# Patient Record
Sex: FEMALE | Race: WHITE | NOT HISPANIC OR LATINO | URBAN - METROPOLITAN AREA
[De-identification: names, ages, dates, MRNs, and addresses within clinical notes are randomized per-mention and may not be internally consistent; named-entity substitution may affect disease eponyms.]

---

## 2020-02-06 ENCOUNTER — TRANSFERRED RECORDS (OUTPATIENT)
Dept: HEALTH INFORMATION MANAGEMENT | Facility: CLINIC | Age: 35
End: 2020-02-06

## 2020-03-26 ENCOUNTER — TRANSFERRED RECORDS (OUTPATIENT)
Dept: HEALTH INFORMATION MANAGEMENT | Facility: CLINIC | Age: 35
End: 2020-03-26

## 2020-08-25 PROBLEM — H43.89 VITRITIS: Status: ACTIVE | Noted: 2020-08-25

## 2020-08-26 ENCOUNTER — OFFICE VISIT (OUTPATIENT)
Dept: OPHTHALMOLOGY | Facility: CLINIC | Age: 35
End: 2020-08-26
Attending: OPHTHALMOLOGY
Payer: MEDICARE

## 2020-08-26 DIAGNOSIS — H35.063 RETINAL VASCULITIS OF BOTH EYES: Primary | ICD-10-CM

## 2020-08-26 DIAGNOSIS — Z79.899 HIGH RISK MEDICATION USE: ICD-10-CM

## 2020-08-26 DIAGNOSIS — Z15.89 HLA B27 (HLA B27 POSITIVE): ICD-10-CM

## 2020-08-26 DIAGNOSIS — G93.2 PSEUDOTUMOR CEREBRI: ICD-10-CM

## 2020-08-26 PROBLEM — H30.23 INTERMEDIATE UVEITIS OF BOTH EYES: Status: ACTIVE | Noted: 2020-08-18

## 2020-08-26 PROBLEM — H20.9 UVEITIS: Status: ACTIVE | Noted: 2020-08-18

## 2020-08-26 PROCEDURE — 92134 CPTRZ OPH DX IMG PST SGM RTA: CPT | Mod: ZF | Performed by: OPHTHALMOLOGY

## 2020-08-26 PROCEDURE — G0463 HOSPITAL OUTPT CLINIC VISIT: HCPCS | Mod: 25

## 2020-08-26 PROCEDURE — 92235 FLUORESCEIN ANGRPH MLTIFRAME: CPT | Mod: ZF | Performed by: OPHTHALMOLOGY

## 2020-08-26 RX ORDER — HYDROXYZINE HYDROCHLORIDE 25 MG/1
25-50 TABLET, FILM COATED ORAL
COMMUNITY
Start: 2020-08-18 | End: 2022-05-20

## 2020-08-26 RX ORDER — LORAZEPAM 0.5 MG/1
0.5 TABLET ORAL
COMMUNITY
Start: 2019-03-25 | End: 2020-12-15

## 2020-08-26 RX ORDER — LEVOCETIRIZINE DIHYDROCHLORIDE 5 MG/1
5 TABLET, FILM COATED ORAL
COMMUNITY
Start: 2020-08-18

## 2020-08-26 ASSESSMENT — CONF VISUAL FIELD
OD_NORMAL: 1
METHOD: COUNTING FINGERS
OS_NORMAL: 1

## 2020-08-26 ASSESSMENT — SLIT LAMP EXAM - LIDS
COMMENTS: NORMAL
COMMENTS: NORMAL

## 2020-08-26 ASSESSMENT — VISUAL ACUITY
OD_SC: 20/20
OS_SC+: -2
OS_SC: 20/25
METHOD: SNELLEN - LINEAR

## 2020-08-26 ASSESSMENT — TONOMETRY
OS_IOP_MMHG: 19
OD_IOP_MMHG: 18
IOP_METHOD: TONOPEN

## 2020-08-26 ASSESSMENT — EXTERNAL EXAM - RIGHT EYE: OD_EXAM: NORMAL

## 2020-08-26 ASSESSMENT — CUP TO DISC RATIO
OD_RATIO: 0.3
OS_RATIO: 0.3

## 2020-08-26 ASSESSMENT — EXTERNAL EXAM - LEFT EYE: OS_EXAM: NORMAL

## 2020-08-26 NOTE — PATIENT INSTRUCTIONS
Additional diagnostic testing for causes of retinal vasculitis: ANCA, Lyme, Bartonella, Quantiferon, Syphilis    Will follow up with Rheumatology as needed    Recommend allergy eye drops or over the counter artificial tears to see if this will help. Would not recommend

## 2020-08-26 NOTE — NURSING NOTE
Chief Complaints and History of Present Illnesses   Patient presents with     Uveitis Evaluation     Chief Complaint(s) and History of Present Illness(es)     Uveitis Evaluation     Laterality: left eye    Quality: In bright lighting she will see a gray sludge over the va.  She noticed it in Nov.      Associated symptoms: photophobia (not new), flashes (in the past has seen sparkles and lights) and floaters (have increased with the gray sludge)    Treatments tried: eye drops    Pain scale: 0/10              Comments     Hx of pseudotumor and pappliadima  in 2012  Using a medicated gtt for the redness every day  Ju Monroy COT 8:24 AM August 26, 2020

## 2020-08-27 ENCOUNTER — TRANSFERRED RECORDS (OUTPATIENT)
Dept: HEALTH INFORMATION MANAGEMENT | Facility: CLINIC | Age: 35
End: 2020-08-27

## 2020-09-18 ENCOUNTER — TELEPHONE (OUTPATIENT)
Dept: RHEUMATOLOGY | Facility: CLINIC | Age: 35
End: 2020-09-18

## 2020-09-18 NOTE — TELEPHONE ENCOUNTER
[facility] Call Center    Phone Message    May a detailed message be left on voicemail: yes     Reason for Call: Appointment Intake    Referring Provider Name: Joel  Diagnosis and/or Symptoms: HLA B27 (HLA B27 positive) [Z15.89]  High risk medication use [Z79.899]      Action Taken: Message routed to:  [facility] Rheum    Travel Screening: Not Applicable

## 2020-09-28 NOTE — TELEPHONE ENCOUNTER
Patient is referred by Bernardo Maldonado MD  for positive HLA-B27    We may ultimately decide that steroid sparing therapy may be warranted but would appreciate rheumatology involvement which may help dictate specific therapy.  Would be in favor of an antimetabolite such as oral mycophenolate    Has patient seen a Rheumatologist in the past? Patient has seen 2 Rheumatologists in the past. Dr. Dudley Benjamin who per referring diagnosed patient with ankylosing spondylitis. Patient also saw Ira Camacho at [facility], last seen 5/4/2020 and did not think that patient has ankylosing spondylitis.     Patient complains of: back pain    Per referring upon exam:      Discussed findings with patient. The HLA B27 positivity may be related ankylosing spondylitis but there is no associated anterior uveitis (typical uveitis association)    The redness of the right eye is likely associated with some allergic changes, although this was not affected by the steroid eye drops. Recommend allergy eye drops or over the counter artificial tears to see if this will help with the redness    The retinal vascular angiography today shows large vessel inflammation which is likely the cause of the floaters in the vision. There are no occlusions ,macular edema, or retinal neovascularization to warrant any urgent treatment at this time.  Discussed that topical steroids are unlikely to significantly improve the symptoms, and that oral prednisone could be used for flares but this is not ideal to be used for long periods of time.  Recommend additional work-up as below    Has patient had imaging that pertains to referral? No    Has patient had labs that pertains to referral reason: Yes    Records are needed from Dr. Benjamin's office prior to making a scheduling decision.     ANCA lab was ordered but never completed.     Sending to clinic support to assist with obtaining records.     Jasmin Robles CMA   9/28/2020 1:26 PM

## 2020-09-30 ENCOUNTER — OFFICE VISIT (OUTPATIENT)
Dept: OPHTHALMOLOGY | Facility: CLINIC | Age: 35
End: 2020-09-30
Attending: OPHTHALMOLOGY
Payer: MEDICARE

## 2020-09-30 DIAGNOSIS — Z15.89 HLA B27 (HLA B27 POSITIVE): ICD-10-CM

## 2020-09-30 DIAGNOSIS — Z79.899 HIGH RISK MEDICATION USE: ICD-10-CM

## 2020-09-30 DIAGNOSIS — G93.2 PSEUDOTUMOR CEREBRI: ICD-10-CM

## 2020-09-30 DIAGNOSIS — H35.063 RETINAL VASCULITIS OF BOTH EYES: Primary | ICD-10-CM

## 2020-09-30 PROCEDURE — G0463 HOSPITAL OUTPT CLINIC VISIT: HCPCS | Mod: ZF

## 2020-09-30 RX ORDER — PREDNISONE 5 MG/1
TABLET ORAL
Qty: 80 TABLET | Refills: 0 | Status: SHIPPED | OUTPATIENT
Start: 2020-09-30 | End: 2020-12-15

## 2020-09-30 ASSESSMENT — EXTERNAL EXAM - RIGHT EYE: OD_EXAM: NORMAL

## 2020-09-30 ASSESSMENT — TONOMETRY
OS_IOP_MMHG: 17
OD_IOP_MMHG: 17
IOP_METHOD: TONOPEN

## 2020-09-30 ASSESSMENT — VISUAL ACUITY
OS_SC+: -2
METHOD: SNELLEN - LINEAR
OS_SC: 20/25 SLOW
OD_SC: 20/20

## 2020-09-30 ASSESSMENT — EXTERNAL EXAM - LEFT EYE: OS_EXAM: NORMAL

## 2020-09-30 ASSESSMENT — SLIT LAMP EXAM - LIDS
COMMENTS: NORMAL
COMMENTS: NORMAL

## 2020-09-30 ASSESSMENT — CONF VISUAL FIELD
OS_NORMAL: 1
METHOD: COUNTING FINGERS
OD_NORMAL: 1

## 2020-09-30 ASSESSMENT — CUP TO DISC RATIO
OD_RATIO: 0.3
OS_RATIO: 0.3

## 2020-09-30 NOTE — PROGRESS NOTES
Chief Complaint/Presenting Concern:  Uveitis follow up    Interval History of Present Ocular Illness:  Richie Thorne is a 35 year old patient who returns for follow up of her retinal vasculitis. Since last visit, she had lab testing for causes of retinal vasculitis as noted below. She reports that the floaters are still present, sometimes even more noticeable.     Interval Updates to Medical/Family/Social History:  Still pending Rheumatology visit.     Relevant Review of Systems Updates:  Still having joint pain. Nausea even on Nexium.     Laboratory Testing August 2020: All negative ANCA, Lyme, Bartonella, Quantiferon, Syphilis    Current eye related medications: None specifically    Retina/Uveitis Imaging: None today    Assessment:     1. Retinal vasculitis of both eyes  Still active with mild vitreous inflammation and sheathing, but no occlusions.    2. Pseudotumor cerebri  Previously treated without active disc edema nor pallor.     3. HLA B27 (HLA B27 positive)  With possible association of ankylosing spondylitis.     4. High risk medication use  Prednisone previously    Plan/Recommendations:      Discussed findings with patient. The vitreous inflammation and retinal vasculitis is stable. There are no retinal vascular occlusions and no retinal edema. Lab testing as above without clear etiology for the retinal vasculitis.     We discussed that the retinal vasculitis is typically not related to HLA B27, so could be incidental. As it is still active, we discussed treatment for this flare as well as long term treatment options.     We discussed mycophenolate as an option for the retinal vasculitis but as Ms. Thorne is prone to nausea, this may not be the best option. We might consider Humira if the Ankylosing Spondylitis can be definitively determined    Do not recommend any additional testing at this time.     Start a course of oral prednisone. We discussed the risks and benefits as well as possible side effects  but elected to start this course knowing that it is likely to help eyes and joints. Take 4 pills (20 mg) each AM x 1 week, then 15 mg each AM x 1 week, then 10 mg each AM x 1 week, then 5 mg each AM x 1 week    RTC   1. Consult Dr. Phu Francis, formerly [facility]. Does Ms. Thorne in fact have Ankylosing Spondylitis? And can we consider Humira as option for retinal vasculitis as well as joint pains?     2. 1 month IOP, dilate, no testing    Update October 30, 2020: Spoke to patient this afternoon she informed us that her friends Covid test had not returned back, so she elected to postpone her appointment scheduled for today.  She saw Dr. Francis in Rheumatology who recommended additional work-up for vasculitis including CT angiogram. Richie is waiting to hear back on the results of those tests.  She also explained that she is seeing more spots in the vision in her right eye and was wondering about treatment options.  She informed me that the prednisone was not very well-tolerated particularly at a dose of 20 mg which caused significant anxiety.  She also informed me that she was not taking the dose at the same time each day and that there were days where she did not take the medication as specified.  I informed her that it would be best to take a regular course of prednisone as instructed with a tapering schedule, as this would help us better determine if it would improve the vision.  We discussed that she should call us if there is a significant worsening in her symptoms, but that she should complete the work-up as specified by Dr. Francis.  I informed her that I would be away for 10 days, and due to a planned surgery on November 18, we talked about a follow-up later in November 2020    Physician Attestation     Attending Physician Attestation:  Complete documentation of historical and exam elements from today's encounter can be found in the full encounter summary report (not reduplicated in this  progress note). I personally obtained the chief complaint(s) and history of present illness. I confirmed and edited as necessary the review of systems, past medical/surgical history, family history, social history, and examination findings as documented by others; and I examined the patient myself. I personally reviewed the relevant tests, images, and reports as documented above. I formulated and edited as necessary the assessment and plan and discussed the findings and management plan with the patient and family members present at the time of this visit.  Bernardo Maldonado M.D., Uveitis and Medical Retina, September 30, 2020

## 2020-09-30 NOTE — PATIENT INSTRUCTIONS
We will contact a Rheumatologist at [facility], Dr. Phu Francis.    Lets Start a course of oral prednisone. We discussed the risks and benefits as well as possible side effects but elected to start this course knowing that it is likely to help eyes and joints. Take 4 pills (20 mg) each AM x 1 week, then 15 mg each AM x 1 week, then 10 mg each AM x 1 week, then 5 mg each AM x 1 week.. Keep taking the Nexium in the morning and try Tums throughout the day. If you have issues, let me know.    Prednisone is a steroid pill. It can be used for many different conditions and usually for short periods of time (a few weeks to a few months), but some people may take it for years. Sometimes we might get blood tests before starting this medication, but not always.    Prednisone is often prescribed as 10 mg pills. It is usually easiest to take the whole dose (all the pills) with breakfast because it can minimize side effects. A very common side effect is stomach discomfort and some people take antacid medications to help with these symptoms. Other side effects which can occur include raising the blood pressure, blood sugar, and weight. It can also cause irritability and trouble sleeping. For these reasons, we will try to limit the amount of time that prednisone is taken.

## 2020-09-30 NOTE — NURSING NOTE
Chief Complaints and History of Present Illnesses   Patient presents with     Uveitis Follow-Up     Chief Complaint(s) and History of Present Illness(es)     Uveitis Follow-Up     Laterality: both eyes    Onset: gradual    Onset: months ago    Quality: States va is the same since last visit      Associated symptoms: flashes (about the same), floaters (about the same) and photophobia (about the same)    Treatments tried: no treatments    Pain scale: 0/10              Comments     Here for Retinal vasculitis of both eyes   Ju Monroy COT 7:57 AM September 30, 2020

## 2020-10-01 ENCOUNTER — NURSE TRIAGE (OUTPATIENT)
Dept: NURSING | Facility: CLINIC | Age: 35
End: 2020-10-01

## 2020-10-01 NOTE — TELEPHONE ENCOUNTER
35 year old female who was examined yesterday in the eye clinic for retinal vasculitis.  She calls today with a headache.  She states she woke up today with a frontal headache.  She states before she opened her eyes it felt like there was air under her eyelids.  She states her eyeballs hurt and her heads burns. She is very sensitive to light   Her vision is unchanged from yesterday. She still has floaters and dots    She is drinking plenty of fluids.  She took some benadryl with no improvement.  She does not want to go to the ED    Instructed her to lay in dark room with cool cloth on forehead and shut her eyes/rest  She is helping 6 year old son online - told her to tell him she will be resting and to get her when needed..    Will forward to eye clinic to call back with further recommendations

## 2020-10-01 NOTE — TELEPHONE ENCOUNTER
Reviewed with Dr. Maldonado    Spoke to pt at 1455    Previous reported eye discomfort following eye exam  Reviewed to try more conservative measures for improvement   Preservative free artificial tears for eye comfort    Reviewed no ophthalmology reason per exam to have severe headache per exam yesterday.    Pt may try tylenol-- 4 grams or less in 24 hour period if instructed by another physician and not to use if has been instructed by another physician. No noted contraindication per pt  May try ibuprofen also-- can upset stomach and may defer with nausea/stomach issues.    Reviewed if not able to control headache may go to Urgent care or emergency department.    Reviewed may call on call provider for any worsening eye symptoms    Pt states will likely to go to ED if not improving-- head, nausea, some dizziness with movement.      Agree with ED if not improving and severe  Mike Gallegos RN 3:09 PM 10/01/20              Spoke to pt at 1408    Reviewed triage note below    Bad migraine type headache after waking up today-- pain and burning sensation behind eyes    No vomitting  Has had some nausea    Did not start oral prednisone with concerns of h/o acid reflux    Periodically has bad headaches-- this one seems more severe than usual    Reaching out to Dr. Maldonado and will call back    Mike Gallegos RN 2:14 PM 10/01/20

## 2020-10-28 ENCOUNTER — TELEPHONE (OUTPATIENT)
Dept: OPHTHALMOLOGY | Facility: CLINIC | Age: 35
End: 2020-10-28

## 2020-10-28 NOTE — TELEPHONE ENCOUNTER
Thanks, good plan if COVID negative. If positive, we should wait a few weeks as per protocol. Thanks for scheduling appointment.

## 2020-10-28 NOTE — TELEPHONE ENCOUNTER
Spoke to pt at 1300  More floaters in right eye starting 10- (noticed in top portion of eye)    No redness  No pain  No new photophobia    Can see bright light in vision at times    COVID test for friend she was with done today and will hope for results by tomorrow    Rescheduled appt back to this Friday at 0800 with Dr. Maldonado    Reviewed if asymptomatic and friend COVID test negative will see Friday as scheduled    Provided direct number if was in contact with COVID positive person to review plan further.    Pt states she tested positive in May for COVID      Note to Dr. Maldonado for review    Mike Gallegos, RN 1:12 PM 10/28/20          [facility] Call Center    Phone Message    May a detailed message be left on voicemail: yes     Reason for Call: Other:   Pt has an appt with Joel that she had to cancel because she came in contact with a coworker who may have possibly been exposed to covid. Pt will wait to find out if the coworker tested positive or not and if not, pt will call back to reschedule.     Pt wants Joel to know that her vision/issues in her right eye is getting worst. Pt did see a rheumatology who ran a bunch of test. Pt was going to wait until this Friday to tell Joel but pt has noticed more dark blotches and floaters in right eye and it's been ongoing for a couple of weeks (since 10/11).     Please follow-up with pt to advise.     Action Taken: Other:  eye    Travel Screening: Not Applicable

## 2020-10-30 NOTE — TELEPHONE ENCOUNTER
Patient called this morning and the test results have not come back yet so she is staying at home.  I told her I would ask Dr. Maldonado to call her and speak with her about the symptoms she is having.     Preferred phone is the [phone]    BEE Sol 7:47 AM 10/30/2020

## 2020-10-30 NOTE — TELEPHONE ENCOUNTER
Update October 30, 2020: Spoke to patient this afternoon she informed us that her friends Covid test had not returned back, so she elected to postpone her appointment scheduled for today.  She saw Dr. Francis in Rheumatology who recommended additional work-up for vasculitis including CT angiogram. Richie is waiting to hear back on the results of those tests.  She also explained that she is seeing more spots in the vision in her right eye and was wondering about treatment options.  She informed me that the prednisone was not very well-tolerated particularly at a dose of 20 mg which caused significant anxiety.  She also informed me that she was not taking the dose at the same time each day and that there were days where she did not take the medication as specified.  I informed her that it would be best to take a regular course of prednisone as instructed with a tapering schedule, as this would help us better determine if it would improve the vision.  We discussed that she should call us if there is a significant worsening in her symptoms, but that she should complete the work-up as specified by Dr. Francis.  I informed her that I would be away for 10 days, and due to a planned surgery on November 18, we talked about a follow-up later in November.     We'll kindly ask the staff to reach out to [facility] next week as we might schedule something in this timeframe.

## 2020-11-02 NOTE — TELEPHONE ENCOUNTER
Action 11.02.2020 RM   Action Taken Sent fax request to [facility] to  Dr. Turcios to request records. Sent fax to [phone].

## 2020-11-04 ENCOUNTER — TELEPHONE (OUTPATIENT)
Dept: OPHTHALMOLOGY | Facility: CLINIC | Age: 35
End: 2020-11-04

## 2020-12-14 ENCOUNTER — TELEPHONE (OUTPATIENT)
Dept: OPHTHALMOLOGY | Facility: CLINIC | Age: 35
End: 2020-12-14

## 2020-12-14 NOTE — TELEPHONE ENCOUNTER
I called the patient and noted that the doctor will review her results for her MRI tomorrow at her appointment.  She thanked me for the return phone call

## 2020-12-14 NOTE — TELEPHONE ENCOUNTER
[facility] Call Center    Phone Message    May a detailed message be left on voicemail: yes     Reason for Call: Other: See Ju's VM from 12/7. Pt called in to set up appt - scheduled for 12/15. She notes that [facility] advised her to check in with Dr Maldonado as her MRI with them from November should be reviewed. Please review    Pt also notes that she is still having 'deep, searing pain' in her eye.     Action Taken: Message routed to:  [facility] EYE    Travel Screening: Not Applicable

## 2020-12-15 ENCOUNTER — OFFICE VISIT (OUTPATIENT)
Dept: OPHTHALMOLOGY | Facility: CLINIC | Age: 35
End: 2020-12-15
Attending: OPHTHALMOLOGY
Payer: MEDICARE

## 2020-12-15 DIAGNOSIS — H35.063 RETINAL VASCULITIS OF BOTH EYES: Primary | ICD-10-CM

## 2020-12-15 DIAGNOSIS — Z79.899 HIGH RISK MEDICATION USE: ICD-10-CM

## 2020-12-15 DIAGNOSIS — G93.2 PSEUDOTUMOR CEREBRI: ICD-10-CM

## 2020-12-15 DIAGNOSIS — R74.8 ABNORMAL SERUM ACE LEVEL: ICD-10-CM

## 2020-12-15 DIAGNOSIS — Z15.89 HLA B27 (HLA B27 POSITIVE): ICD-10-CM

## 2020-12-15 PROCEDURE — 92250 FUNDUS PHOTOGRAPHY W/I&R: CPT | Performed by: OPHTHALMOLOGY

## 2020-12-15 PROCEDURE — 92134 CPTRZ OPH DX IMG PST SGM RTA: CPT | Performed by: OPHTHALMOLOGY

## 2020-12-15 PROCEDURE — G0463 HOSPITAL OUTPT CLINIC VISIT: HCPCS

## 2020-12-15 PROCEDURE — 99215 OFFICE O/P EST HI 40 MIN: CPT | Performed by: OPHTHALMOLOGY

## 2020-12-15 RX ORDER — ETODOLAC 600 MG/1
TABLET, EXTENDED RELEASE ORAL
COMMUNITY
Start: 2020-12-09 | End: 2022-05-20

## 2020-12-15 ASSESSMENT — VISUAL ACUITY
METHOD: SNELLEN - LINEAR
OD_SC+: -1
OS_SC: 20/40
OD_SC: 20/20
OS_PH_SC: 20/30

## 2020-12-15 ASSESSMENT — CUP TO DISC RATIO
OS_RATIO: 0.3
OD_RATIO: 0.3

## 2020-12-15 ASSESSMENT — SLIT LAMP EXAM - LIDS
COMMENTS: NORMAL
COMMENTS: NORMAL

## 2020-12-15 ASSESSMENT — CONF VISUAL FIELD
OS_NORMAL: 1
METHOD: COUNTING FINGERS
OD_NORMAL: 1

## 2020-12-15 ASSESSMENT — TONOMETRY
IOP_METHOD: TONOPEN
OD_IOP_MMHG: 21
OS_IOP_MMHG: 19

## 2020-12-15 ASSESSMENT — EXTERNAL EXAM - LEFT EYE: OS_EXAM: NORMAL

## 2020-12-15 ASSESSMENT — EXTERNAL EXAM - RIGHT EYE: OD_EXAM: NORMAL

## 2020-12-15 NOTE — PATIENT INSTRUCTIONS
We will be in touch with Dr. Francis about Remicade or Humira    Try artificial tears 3-4x in each eye. Brands like Refresh or Systane    We will see you back in 3 months.

## 2020-12-15 NOTE — LETTER
12/15/2020       RE: Richie Thorne  [address]  [place] [address]     Dear Colleague,    Thank you for referring your patient, Richie Thorne, to the [facility] at [facility]. Please see a copy of my visit note below.    Chief Complaint/Presenting Concern:  Uveitis evaluation    Interval History of Present Ocular Illness:  Richie Thorne is a 35 year old patient who returns for follow up of her retinal vasculitis. She was last seen in September 2020 at which time we discussed possible consideration of longer term steroid sparing options. Since then, Ms. Quiroz has seen Dr. Francis in Rheumatology and has undergone additional testing including MRI brain and CT scans of the abdomen and pelvis. She did not have any signs of abdominal vasculitis or clear signs of sacroilitis. We did have a conversation a few months ago about a course of oral prednisone again, but as this was not well tolerated, we elected to monitor in the interim.    Richie reports there are more dots in the right eye which are more persistent and and more noticeable. There are some days which are better than others.     Interval Updates to Medical/Family/Social History:    MRI Brain without demyelinating lesions after evaluation with Dr. Francis  Bariatric surgery got delayed given car issues. Awaiting reshceduling    Son still having pink/redness which is not getting better with allergy drops    Relevant Review of Systems Updates:  Still having headches, no cough or cold symptoms. Still having body aches    Laboratory Testing Reviewed (October 2020-November 2020)  Abnormal:ACE elevated 81.  MATT 1:40. C3 elevated at 185. CRP slightly elevated at 1.2  Normal/negative: BMP, lysozyme.  Scleroderma panel, Serum beta-2 glycoprotein, anticardiolipin, ESR, IgG subtypes, rheumatoid factor, ANCA, cryoglobulins    Current eye related medications: None     Retina/Uveitis Imaging:   OCT  Spectralis Macula December 15, 2020  right eye: Improved media, no CME  left eye: Slightly worse media, no CME    Optos Fundus Photos OU (both eyes) December 15, 2020  right eye: Few vitreous opacities, old water lashaun but no disc edema  left eye: Few opacities, old glial lashaun, no disc edema    Assessment:     1. Retinal vasculitis of both eyes  Non-occlusive. Stable focal areas of sheathing, no occlusions, few more floaters in each eye but no cystoid macular edema.  Angiogram deferred given prior migraines subsequent to testing    2. Pseudotumor cerebri  Inactive without disc edema    3. HLA B27 (HLA B27 positive)  Without known systemic manifestations    4. Abnormal serum ACE level  With normal CT scan of the chest    5. High risk medication use  With plans to consider Humira or Remicade    Plan/Recommendations:      Discussed findings with patient.  Work-up to date has been unrevealing for any systemic causes of retinal vasculitis suggesting that this is idiopathic (without clear cause).  Agree that there is increase in vitreous floaters with persistent retinal vascular inflammation.  Fortunately there is no cystoid macular edema and no retinal vascular occlusions which would necessitate more urgent treatment with medication such as prednisone or steroid eye injections    Appreciate the extensive work-up with Dr. Francis in Rheumatology who did not identify any other systemic causes of vasculitis, rheumatoid arthritis, lupus.  Body imaging was negative for demyelinating disease on brain MRI or any signs of sarcoidosis on CT scan of the chest    The pain and redness of the eyes are unlikely related to the retinal vasculitis.  While HLA-B27 positivity can be associated with anterior uveitis, no signs are present on this examination today nor at our last visit.  These symptoms are likely related to eye dryness and/or allergy symptoms    As there is no recurrence of the optic disc edema, no Diamox or other treatment  for papilledema as necessary    Do not recommend additional diagnostic testing at this time    Would recommend starting Humira or Remicade with Dr. Francis. Patient prefers Remicade but opened to which ever would be covered. From Uveitis standpoint, they are similar/equivalent.  We discussed that these both have similar benefit in treating retinal vasculitis.  The benefit to Remicade is that the infusions are given typically monthly and the dose can be titrated by weight.  The benefits Humira is that the injections can be done at home which may be more convenient.      We also discussed that the cause of the diffuse pain symptoms is unclear at this time.  I informed Richie that it is unclear if Humira or Remicade will improve the symptoms.    Recommend her Son to have an evaluation with Dr. Lise Shepard in Pediatric Ophthalmology for chronic red eye. Prior allergy testing positive for cats, allergy drops did not help    Try artificial tears for dryness/redness    RTC 3 months IOP, dilate, OCT, Photos (ordered)    Physician Attestation     Attending Physician Attestation:  Complete documentation of historical and exam elements from today's encounter can be found in the full encounter summary report (not reduplicated in this progress note). I personally obtained the chief complaint(s) and history of present illness. I confirmed and edited as necessary the review of systems, past medical/surgical history, family history, social history, and examination findings as documented by others; and I examined the patient myself. I personally reviewed the relevant tests, images, and reports as documented above. I formulated and edited as necessary the assessment and plan and discussed the findings and management plan with the patient and family members present at the time of this visit. Bernardo Maldonado M.D., Uveitis and Medical Retina, December 15, 2020     Again, thank you for allowing me to participate in the care of your  patient.      Sincerely,    Bernardo Maldonado MD  [facility] Dept of Ophthalmology  Uveitis and Medical Retina

## 2020-12-15 NOTE — PROGRESS NOTES
Chief Complaint/Presenting Concern:  Uveitis evaluation    Interval History of Present Ocular Illness:  Richie Thorne is a 35 year old patient who returns for follow up of her retinal vasculitis. She was last seen in September 2020 at which time we discussed possible consideration of longer term steroid sparing options. Since then, Ms. Quiroz has seen Dr. Francis in Rheumatology and has undergone additional testing including MRI brain and CT scans of the abdomen and pelvis. She did not have any signs of abdominal vasculitis or clear signs of sacroilitis. We did have a conversation a few months ago about a course of oral prednisone again, but as this was not well tolerated, we elected to monitor in the interim.    Richie reports there are more dots in the right eye which are more persistent and and more noticeable. There are some days which are better than others.     Interval Updates to Medical/Family/Social History:    MRI Brain without demyelinating lesions after evaluation with Dr. Francis  Bariatric surgery got delayed given car issues. Awaiting reshceduling    Son still having pink/redness which is not getting better with allergy drops    Relevant Review of Systems Updates:  Still having headches, no cough or cold symptoms. Still having body aches    Laboratory Testing Reviewed (October 2020-November 2020)  Abnormal:ACE elevated 81.  MATT 1:40. C3 elevated at 185. CRP slightly elevated at 1.2  Normal/negative: BMP, lysozyme.  Scleroderma panel, Serum beta-2 glycoprotein, anticardiolipin, ESR, IgG subtypes, rheumatoid factor, ANCA, cryoglobulins    Current eye related medications: None     Retina/Uveitis Imaging:   OCT Spectralis Macula December 15, 2020  right eye: Improved media, no CME  left eye: Slightly worse media, no CME    Optos Fundus Photos OU (both eyes) December 15, 2020  right eye: Few vitreous opacities, old water lashaun but no disc edema  left eye: Few opacities, old glial lashaun, no disc  edema    Assessment:     1. Retinal vasculitis of both eyes  Non-occlusive. Stable focal areas of sheathing, no occlusions, few more floaters in each eye but no cystoid macular edema.  Angiogram deferred given prior migraines subsequent to testing    2. Pseudotumor cerebri  Inactive without disc edema    3. HLA B27 (HLA B27 positive)  Without known systemic manifestations    4. Abnormal serum ACE level  With normal CT scan of the chest    5. High risk medication use  With plans to consider Humira or Remicade    Plan/Recommendations:      Discussed findings with patient.  Work-up to date has been unrevealing for any systemic causes of retinal vasculitis suggesting that this is idiopathic (without clear cause).  Agree that there is increase in vitreous floaters with persistent retinal vascular inflammation.  Fortunately there is no cystoid macular edema and no retinal vascular occlusions which would necessitate more urgent treatment with medication such as prednisone or steroid eye injections    Appreciate the extensive work-up with Dr. Francis in Rheumatology who did not identify any other systemic causes of vasculitis, rheumatoid arthritis, lupus.  Body imaging was negative for demyelinating disease on brain MRI or any signs of sarcoidosis on CT scan of the chest    The pain and redness of the eyes are unlikely related to the retinal vasculitis.  While HLA-B27 positivity can be associated with anterior uveitis, no signs are present on this examination today nor at our last visit.  These symptoms are likely related to eye dryness and/or allergy symptoms    As there is no recurrence of the optic disc edema, no Diamox or other treatment for papilledema as necessary    Do not recommend additional diagnostic testing at this time    Would recommend starting Humira or Remicade with Dr. Francis. Patient prefers Remicade but opened to which ever would be covered. From Uveitis standpoint, they are similar/equivalent.  We  discussed that these both have similar benefit in treating retinal vasculitis.  The benefit to Remicade is that the infusions are given typically monthly and the dose can be titrated by weight.  The benefits Humira is that the injections can be done at home which may be more convenient.      We also discussed that the cause of the diffuse pain symptoms is unclear at this time.  I informed Richie that it is unclear if Humira or Remicade will improve the symptoms.    Recommend her Son to have an evaluation with Dr. Lise Shepard in Pediatric Ophthalmology for chronic red eye. Prior allergy testing positive for cats, allergy drops did not help    Try artificial tears for dryness/redness    RTC 3 months IOP, dilate, OCT, Photos (ordered)    Physician Attestation     Attending Physician Attestation:  Complete documentation of historical and exam elements from today's encounter can be found in the full encounter summary report (not reduplicated in this progress note). I personally obtained the chief complaint(s) and history of present illness. I confirmed and edited as necessary the review of systems, past medical/surgical history, family history, social history, and examination findings as documented by others; and I examined the patient myself. I personally reviewed the relevant tests, images, and reports as documented above. I formulated and edited as necessary the assessment and plan and discussed the findings and management plan with the patient and family members present at the time of this visit.  Bernardo Maldoando M.D., Uveitis and Medical Retina, December 15, 2020

## 2020-12-15 NOTE — NURSING NOTE
Chief Complaints and History of Present Illnesses   Patient presents with     Retinal Vasculitis Follow Up     Chief Complaint(s) and History of Present Illness(es)     Retinal Vasculitis Follow Up     Laterality: both eyes    Onset: gradual    Onset: months ago    Quality: Feels the va is about the same      Associated symptoms: floaters (in the RE), eye pain (deep sharp pain in the LE that started in Oct but is in BE now), flashes (in BE), photophobia (increases when there is pain in the eyes) and headache (in the BE but lasted for 6 days in the RE)    Treatments tried: no treatments    Pain scale: 0/10              Comments     Here for Retinal vasculitis of both eyes   Used a gtt but is unsure which 3 weeks ago but discontinue   Ju Monroy COT 10:38 AM December 15, 2020

## 2021-03-15 ENCOUNTER — OFFICE VISIT (OUTPATIENT)
Dept: OPHTHALMOLOGY | Facility: CLINIC | Age: 36
End: 2021-03-15
Attending: OPHTHALMOLOGY
Payer: MEDICARE

## 2021-03-15 DIAGNOSIS — H35.063 RETINAL VASCULITIS OF BOTH EYES: Primary | ICD-10-CM

## 2021-03-15 DIAGNOSIS — Z15.89 HLA B27 (HLA B27 POSITIVE): ICD-10-CM

## 2021-03-15 DIAGNOSIS — G93.2 PSEUDOTUMOR CEREBRI: ICD-10-CM

## 2021-03-15 DIAGNOSIS — Z79.899 HIGH RISK MEDICATION USE: ICD-10-CM

## 2021-03-15 PROCEDURE — 92134 CPTRZ OPH DX IMG PST SGM RTA: CPT | Performed by: OPHTHALMOLOGY

## 2021-03-15 PROCEDURE — 92250 FUNDUS PHOTOGRAPHY W/I&R: CPT | Performed by: OPHTHALMOLOGY

## 2021-03-15 PROCEDURE — G0463 HOSPITAL OUTPT CLINIC VISIT: HCPCS

## 2021-03-15 PROCEDURE — 99213 OFFICE O/P EST LOW 20 MIN: CPT | Performed by: OPHTHALMOLOGY

## 2021-03-15 PROCEDURE — 99207 FUNDUS PHOTOS OU (BOTH EYES): CPT | Mod: 26 | Performed by: OPHTHALMOLOGY

## 2021-03-15 ASSESSMENT — VISUAL ACUITY
OD_SC: 20/20
OS_PH_SC+: -2
OS_SC: 20/30
METHOD: SNELLEN - LINEAR
OS_PH_SC: 20/20
OS_SC+: -2
OD_SC+: -2

## 2021-03-15 ASSESSMENT — SLIT LAMP EXAM - LIDS
COMMENTS: NORMAL
COMMENTS: NORMAL

## 2021-03-15 ASSESSMENT — CONF VISUAL FIELD
OD_NORMAL: 1
METHOD: COUNTING FINGERS
OS_NORMAL: 1

## 2021-03-15 ASSESSMENT — TONOMETRY
OD_IOP_MMHG: 15
IOP_METHOD: TONOPEN
OS_IOP_MMHG: 15

## 2021-03-15 ASSESSMENT — EXTERNAL EXAM - LEFT EYE: OS_EXAM: NORMAL

## 2021-03-15 ASSESSMENT — CUP TO DISC RATIO
OD_RATIO: 0.3
OS_RATIO: 0.3

## 2021-03-15 ASSESSMENT — EXTERNAL EXAM - RIGHT EYE: OD_EXAM: NORMAL

## 2021-03-15 NOTE — LETTER
3/15/2021       RE: Richie Thorne  [address]  [facility] [address]     Dear Colleague,    Thank you for referring your patient, Richie Thorne, to the [facility] at [facility]. Please see a copy of my visit note below.    Chief Complaint/Presenting Concern:  Uveitis follow up    Interval History of Present Ocular Illness:  Richie Thorne is a 36 year old patient who returns for follow up of her retinal vasculitis of each eye. Since last visit Dr. Francis who looked into Humira and Remicade but these were not covered by insurance. Richie elected to try a course of methotrexate for one month, but this caused weight gain as well as vaginal bleeding as below, so this was stopped after one month.   Richie is still having some floaters, and perhaps slightly better. No eye pain.     Interval Updates to Medical/Family/Social History:    1. Episode of possible heart failure, but saw Cardiologist who said this was possibly thyroid but not heart failure and did not recommend any hear medications.   2. There were two episode of more heavy vaginal bleeding while on Methotrexate and this necessitated an ER visit.   3. Got another course (one week) of oral steroid for a severe bronchitis.     Relevant Review of Systems Updates:  Gained 15 pounds, more stress. Some crackling sounds at night in the throat    Laboratory Testing February 2021: Normal CBC     Current eye related medications: Dry eye drops    Retina/Uveitis Imaging:   OCT Spectralis Macula March 15, 2021  right eye: Normal foveal contour. No recurrent NFL thickening   left eye: Normal foveal contour. No recurrent NFL thickening     Optos Fundus Photos OU (both eyes) March 15, 2021  right eye: Old watermark at disc, temporal white without pressure, few opacities  left eye: Old watermark at disc, temporal white without pressure, few opacities    Assessment:     1. Retinal vasculitis of both  eyes  Dilation deferred today, but no obvious large floaters and no retinal edema.     2. Pseudotumor cerebri  Without recurrence on undilated exam    3. HLA B27 (HLA B27 positive)  Without clear systemic associations, although with joint pains.     4. High risk medication use  Did not tolerate Methotrexate, now off predinsone    Plan/Recommendations:      Discussed findings with patient. There is minimal inflammation in each eye and no macular edema. It is possible that the few courses of oral steroid and the one month of methotrexate helped, but also that things have improved with time. There are no retinal vascular occlusions necessitating urgent intervention such as retinal laser    Do not recommend additional diagnostic testing at this time    There is improved dryness of each eye with artificial tear use, so continue using these which has helped the eyes feel better    There is no recurrence of the pseudotumor cerebri on undilated view of the optic nerves and by OCT over the optic nerves (even with a few courses or oral steroid).     As medications not well tolerated (and others not approved) as well as clinical improvement, recommend observing without treatment at this time.     We briefly discussed steroid eye injections (which have some associated risks) but recommend holding off as things are better today compared to last visit.     RTC 4 months IOP, no dilation (unless more symptoms) but with OCT (ordered)    Attending Physician Attestation:  Complete documentation of historical and exam elements from today's encounter can be found in the full encounter summary report (not reduplicated in this progress note). I personally obtained the chief complaint(s) and history of present illness. I confirmed and edited as necessary the review of systems, past medical/surgical history, family history, social history, and examination findings as documented by others; and I examined the patient myself. I personally  reviewed the relevant tests, images, and reports as documented above. I formulated and edited as necessary the assessment and plan and discussed the findings and management plan with the patient and family members present at the time of this visit. Bernardo Maldonado M.D., Uveitis and Medical Retina, March 15, 2021     Again, thank you for allowing me to participate in the care of your patient.      Sincerely,    Bernardo Maldonado MD  [facility] Dept of Ophthalmology  Uveitis and Medical Retina

## 2021-03-15 NOTE — PROGRESS NOTES
Chief Complaint/Presenting Concern:  Uveitis follow up    Interval History of Present Ocular Illness:  Richie Thorne is a 36 year old patient who returns for follow up of her retinal vasculitis of each eye. Since last visit Dr. Francis who looked into Humira and Remicade but these were not covered by insurance. Richie elected to try a course of methotrexate for one month, but this caused weight gain as well as vaginal bleeding as below, so this was stopped after one month.   Richie is still having some floaters, and perhaps slightly better. No eye pain.     Interval Updates to Medical/Family/Social History:    1. Episode of possible heart failure, but saw Cardiologist who said this was possibly thyroid but not heart failure and did not recommend any hear medications.   2. There were two episode of more heavy vaginal bleeding while on Methotrexate and this necessitated an ER visit.   3. Got another course (one week) of oral steroid for a severe bronchitis.     Relevant Review of Systems Updates:  Gained 15 pounds, more stress. Some crackling sounds at night in the throat    Laboratory Testing February 2021: Normal CBC     Current eye related medications: Dry eye drops    Retina/Uveitis Imaging:   OCT Spectralis Macula March 15, 2021  right eye: Normal foveal contour. No recurrent NFL thickening   left eye: Normal foveal contour. No recurrent NFL thickening     Optos Fundus Photos OU (both eyes) March 15, 2021  right eye: Old watermark at disc, temporal white without pressure, few opacities  left eye: Old watermark at disc, temporal white without pressure, few opacities    Assessment:     1. Retinal vasculitis of both eyes  Dilation deferred today, but no obvious large floaters and no retinal edema.     2. Pseudotumor cerebri  Without recurrence on undilated exam    3. HLA B27 (HLA B27 positive)  Without clear systemic associations, although with joint pains.     4. High risk medication use  Did not tolerate  Methotrexate, now off predinsone    Plan/Recommendations:      Discussed findings with patient. There is minimal inflammation in each eye and no macular edema. It is possible that the few courses of oral steroid and the one month of methotrexate helped, but also that things have improved with time. There are no retinal vascular occlusions necessitating urgent intervention such as retinal laser    Do not recommend additional diagnostic testing at this time    There is improved dryness of each eye with artificial tear use, so continue using these which has helped the eyes feel better    There is no recurrence of the pseudotumor cerebri on undilated view of the optic nerves and by OCT over the optic nerves (even with a few courses or oral steroid).     As medications not well tolerated (and others not approved) as well as clinical improvement, recommend observing without treatment at this time.     We briefly discussed steroid eye injections (which have some associated risks) but recommend holding off as things are better today compared to last visit.     RTC 4 months IOP, no dilation (unless more symptoms) but with OCT (ordered)    Physician Attestation     Attending Physician Attestation:  Complete documentation of historical and exam elements from today's encounter can be found in the full encounter summary report (not reduplicated in this progress note). I personally obtained the chief complaint(s) and history of present illness. I confirmed and edited as necessary the review of systems, past medical/surgical history, family history, social history, and examination findings as documented by others; and I examined the patient myself. I personally reviewed the relevant tests, images, and reports as documented above. I formulated and edited as necessary the assessment and plan and discussed the findings and management plan with the patient and family members present at the time of this visit.  Bernardo Maldonado M.D.,  Uveitis and Medical Retina, March 15, 2021

## 2021-03-15 NOTE — NURSING NOTE
Chief Complaints and History of Present Illnesses   Patient presents with     Follow Up     Retinal vasculitis of both eyes     Chief Complaint(s) and History of Present Illness(es)     Follow Up     Laterality: both eyes    Course: stable    Associated symptoms: floaters (some have seemingly resolved since her last exam), eye pain, photophobia and redness    Treatments tried: artificial tears    Pain scale: 0/10 (None currently)    Comments: Retinal vasculitis of both eyes              Comments     She states that her vision has seemed stable in both eyes since her last eye exam.  She continues to get some sharp eye pains in both eyes, though more in the left eye.    She took Methotrexate for a few weeks but stopped because the medication seemed to make her feel sick to her stomach.  Insurance did not cover Humira or Remicade.    Oneyda Majano, COT 10:45 AM  March 15, 2021

## 2021-03-15 NOTE — PATIENT INSTRUCTIONS
There is improved dryness of each eye with artificial tear use, so continue using these     As medications not well tolerated (and others not approved) as well as clinical improvement, recommend observing without treatment at this time. We briefly discussed steroid eye injections (which have some associated risks) but recommend holding off as things are better

## 2021-05-27 ENCOUNTER — RECORDS - HEALTHEAST (OUTPATIENT)
Dept: ADMINISTRATIVE | Facility: CLINIC | Age: 36
End: 2021-05-27

## 2021-05-28 ENCOUNTER — RECORDS - HEALTHEAST (OUTPATIENT)
Dept: ADMINISTRATIVE | Facility: CLINIC | Age: 36
End: 2021-05-28

## 2021-06-01 ENCOUNTER — OFFICE VISIT (OUTPATIENT)
Dept: OPHTHALMOLOGY | Facility: CLINIC | Age: 36
End: 2021-06-01
Attending: OPHTHALMOLOGY
Payer: MEDICARE

## 2021-06-01 DIAGNOSIS — G93.2 PSEUDOTUMOR CEREBRI: ICD-10-CM

## 2021-06-01 DIAGNOSIS — H35.063 RETINAL VASCULITIS OF BOTH EYES: Primary | ICD-10-CM

## 2021-06-01 DIAGNOSIS — Z79.899 HIGH RISK MEDICATION USE: ICD-10-CM

## 2021-06-01 PROCEDURE — 92134 CPTRZ OPH DX IMG PST SGM RTA: CPT | Performed by: OPHTHALMOLOGY

## 2021-06-01 PROCEDURE — G0463 HOSPITAL OUTPT CLINIC VISIT: HCPCS

## 2021-06-01 PROCEDURE — 99214 OFFICE O/P EST MOD 30 MIN: CPT | Performed by: OPHTHALMOLOGY

## 2021-06-01 RX ORDER — DIFLUPREDNATE OPHTHALMIC 0.5 MG/ML
1 EMULSION OPHTHALMIC 2 TIMES DAILY
Qty: 5 ML | Refills: 2 | Status: SHIPPED | OUTPATIENT
Start: 2021-06-01 | End: 2022-05-20

## 2021-06-01 RX ORDER — PRAMIPEXOLE DIHYDROCHLORIDE 0.12 MG/1
0.12 TABLET ORAL DAILY
COMMUNITY
Start: 2021-05-26 | End: 2022-05-20

## 2021-06-01 ASSESSMENT — VISUAL ACUITY
OD_SC: 20/20
OS_SC+: -2
OS_SC: 20/25
METHOD: SNELLEN - LINEAR

## 2021-06-01 ASSESSMENT — EXTERNAL EXAM - LEFT EYE: OS_EXAM: NORMAL

## 2021-06-01 ASSESSMENT — SLIT LAMP EXAM - LIDS
COMMENTS: NORMAL
COMMENTS: NORMAL

## 2021-06-01 ASSESSMENT — CUP TO DISC RATIO
OS_RATIO: 0.3
OD_RATIO: 0.3

## 2021-06-01 ASSESSMENT — EXTERNAL EXAM - RIGHT EYE: OD_EXAM: NORMAL

## 2021-06-01 ASSESSMENT — TONOMETRY
OD_IOP_MMHG: 13
IOP_METHOD: APPLANATION
OS_IOP_MMHG: 14

## 2021-06-01 ASSESSMENT — CONF VISUAL FIELD
OD_NORMAL: 1
OS_NORMAL: 1
METHOD: COUNTING FINGERS

## 2021-06-01 NOTE — LETTER
6/1/2021       RE: Richie Thorne  [address]  [facility] [address]     Dear Colleague,    Thank you for referring your patient, Richie Thorne, to the [facility] at [facility]. Please see a copy of my visit note below.    Chief Complaint/Presenting Concern:  Uveitis     Interval History of Present Ocular Illness:  Richie Thorne is a 36 year old patient who returns for follow up of her retinal vasculitis. At last visit in March 2021, we did not identify any worsening inflammation in either eye. We recommended continued observation.    Recently, the floaters have been increased in each eye without much pain but dryness symptoms. She also notices a sheet of floaters in the vision in the right eye which are interfering with driving in certain lighting conditions    Interval Updates to Medical/Family/Social History:    1. Insurance updated and may possibly approve Humira or Remicade  2. Working on getting checked for Molds  3. Dentist recently found metal in her Mouth from an old filling.   4. Has not received COVID vaccination but still considering  5. Abdominal surgery went well for endometriosis.    Relevant Review of Systems Updates:  No major change in headache frequency, no cough/cold symptoms.     Laboratory Testing (reviewed from May 2021): CBC and BMP within normal limits     Current eye related medications: None specifically    Retina/Uveitis Imaging:   OCT Spectralis Macula June 1, 2021  right eye: No macular edema. No new optic nerve edema   left eye: No macular edema. 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 Retinal vasculitis of both eyes  Recent increase in symptoms. Recurrent vitreous floaters with focal areas of retinal vascular sheathing but no occlusions nor retinal edema.     2. Pseudotumor cerebri  No recurrence clinically and no increase in optic nerve thickness by  imaging    3. High risk medication use  New insurance changes may allow biologic therapy such as Humira or Remicade     Plan/Recommendations:      Discussed findings with patient. There is a return of clinical retinal vascular sheathing with vitreous floaters, more prominent in the right eye than left eye. Fortunately, there are no retinal vascular occlusions and no retinal edema.     There is no recurrence of pseudotumor cerebri associated optic disc edema     Eye pressure is normal in each eye     Do not recommend additional diagnostic testing at this time as we are starting topical steroid drops    Start Durezol 2x/day in each eye. We discussed the risks associated with stronger steroid drops, mainly eye pressure elevation and cataract, but that less potent steroid drops would not work as well for vitreous floaters. It is also reasonable to observe, but given increase in vitreous floaters which are interfering with vision, this steroid drop is reasonable    We also discussed steroid eye injections and biologic therapy with Humira or Remicade. It is safest to start with drops and if there is not an improvement by next visit, we can consider some of these other options.      Return for 1 month, Tonopen, Dilate Phenyl only, OCT (ordered).     Attending Physician Attestation:  Complete documentation of historical and exam elements from today's encounter can be found in the full encounter summary report (not reduplicated in this progress note). I personally obtained the chief complaint(s) and history of present illness. I confirmed and edited as necessary the review of systems, past medical/surgical history, family history, social history, and examination findings as documented by others; and I examined the patient myself. I personally reviewed the relevant tests, images, and reports as documented above. I formulated and edited as necessary the assessment and plan and discussed the findings and management plan with  the patient and family members present at the time of this visit. Bernardo Maldonado M.D., Uveitis and Medical Retina, June 1, 2021     Again, thank you for allowing me to participate in the care of your patient.      Sincerely,    Bernardo Maldonado MD  [facility] Dept of Ophthalmology  Uveitis and Medical Retina

## 2021-06-01 NOTE — PROGRESS NOTES
Chief Complaint/Presenting Concern:  Uveitis     Interval History of Present Ocular Illness:  Richie Thorne is a 36 year old patient who returns for follow up of her retinal vasculitis. At last visit in March 2021, we did not identify any worsening inflammation in either eye. We recommended continued observation.    Recently, the floaters have been increased in each eye without much pain but dryness symptoms. She also notices a sheet of floaters in the vision in the right eye which are interfering with driving in certain lighting conditions    Interval Updates to Medical/Family/Social History:    1. Insurance updated and may possibly approve Humira or Remicade  2. Working on getting checked for Molds  3. Dentist recently found metal in her Mouth from an old filling.   4. Has not received COVID vaccination but still considering  5. Abdominal surgery went well for endometriosis.    Relevant Review of Systems Updates:  No major change in headache frequency, no cough/cold symptoms.     Laboratory Testing (reviewed from May 2021): CBC and BMP within normal limits     Current eye related medications: None specifically    Retina/Uveitis Imaging:   OCT Spectralis Macula June 1, 2021  right eye: No macular edema. No new optic nerve edema   left eye: No macular edema. 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 Retinal vasculitis of both eyes  Recent increase in symptoms. Recurrent vitreous floaters with focal areas of retinal vascular sheathing but no occlusions nor retinal edema.     2. Pseudotumor cerebri  No recurrence clinically and no increase in optic nerve thickness by imaging    3. High risk medication use  New insurance changes may allow biologic therapy such as Humira or Remicade     Plan/Recommendations:      Discussed findings with patient. There is a return of clinical retinal vascular sheathing with vitreous floaters, more prominent in the right eye than left eye.  Fortunately, there are no retinal vascular occlusions and no retinal edema.     There is no recurrence of pseudotumor cerebri associated optic disc edema     Eye pressure is normal in each eye     Do not recommend additional diagnostic testing at this time as we are starting topical steroid drops    Start Durezol 2x/day in each eye. We discussed the risks associated with stronger steroid drops, mainly eye pressure elevation and cataract, but that less potent steroid drops would not work as well for vitreous floaters. It is also reasonable to observe, but given increase in vitreous floaters which are interfering with vision, this steroid drop is reasonable    We also discussed steroid eye injections and biologic therapy with Humira or Remicade. It is safest to start with drops and if there is not an improvement by next visit, we can consider some of these other options.      Return for 1 month, Tonopen, Dilate Phenyl only, OCT (ordered).     Physician Attestation     Attending Physician Attestation:  Complete documentation of historical and exam elements from today's encounter can be found in the full encounter summary report (not reduplicated in this progress note). I personally obtained the chief complaint(s) and history of present illness. I confirmed and edited as necessary the review of systems, past medical/surgical history, family history, social history, and examination findings as documented by others; and I examined the patient myself. I personally reviewed the relevant tests, images, and reports as documented above. I formulated and edited as necessary the assessment and plan and discussed the findings and management plan with the patient and family members present at the time of this visit.  Bernardo Maldonado M.D., Uveitis and Medical Retina, June 1, 2021

## 2021-06-01 NOTE — PATIENT INSTRUCTIONS
Start a drop called Durezol 2x/day in each eye     Call us if this is not covered or if you have any issues    We will see you back in 1 month

## 2022-01-29 NOTE — NURSING NOTE
Chief Complaints and History of Present Illnesses   Patient presents with     Uveitis Follow-Up     Chief Complaint(s) and History of Present Illness(es)     Uveitis Follow-Up     Laterality: both eyes    Onset: gradual    Onset: months ago    Course: gradually worsening    Associated symptoms: floaters, eye pain, flashes, dryness, tearing and discharge.  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  She states vision is stable since last visit.  Pt started seeing more floaters right after last visit, RE>LE.  Its like looking through a screen.  Pain has gotten worse with sharp intermittent pain BE.  Flashes are stable.  Was told last week that she has some metal in her bottom right jaw from a filling.  Only using AT's randomly.    She took Methotrexate for a few weeks but stopped because the medication seemed to make her feel sick to her stomach.  Insurance did not cover Humira or Remicade.    Francisco Grimes, BEE June 1, 2021 3:48 PM                  
1 = Total assistance

## 2022-02-17 PROBLEM — Z79.899 HIGH RISK MEDICATION USE: Status: ACTIVE | Noted: 2020-08-26

## 2022-02-17 PROBLEM — G93.2 PSEUDOTUMOR CEREBRI: Status: ACTIVE | Noted: 2020-08-25

## 2022-02-17 PROBLEM — H35.063 RETINAL VASCULITIS OF BOTH EYES: Status: ACTIVE | Noted: 2020-08-18

## 2022-02-17 PROBLEM — Z15.89 HLA B27 (HLA B27 POSITIVE): Status: ACTIVE | Noted: 2020-08-26

## 2022-05-19 ENCOUNTER — TELEPHONE (OUTPATIENT)
Dept: OPHTHALMOLOGY | Facility: CLINIC | Age: 37
End: 2022-05-19
Payer: MEDICARE

## 2022-05-19 NOTE — TELEPHONE ENCOUNTER
Thank you for the note and for scheduling Richie with Dr. Hernandez. I am happy to see this patient with him. Given history of retinal vasculitis, we should think about an FA and possibly B-scan pending evaluation. Thanks both

## 2022-05-19 NOTE — TELEPHONE ENCOUNTER
Spoke to pt at 1440    Left eye with quick flashing in peripheral vision about 3 weeks ago then about 5 days later dark area in peripherally vision      Scheduled tomorrow in Acute clinic with Dr. Hernandez at 0915 after pt brings child to school    Pt picking up child at 4:15 PM today-- symptoms present times 2-3 weeks     Dr. Pugh or Dr. Maldonado to staff.    Pt aware of date/time/location    Mike Gallegos RN 2:51 PM 05/19/22            [facility]    Phone Message    May a detailed message be left on voicemail: yes     Reason for Call: Symptoms or Concerns     If patient has red-flag symptoms, warm transfer to triage line    Current symptom or concern: Pt reports a few weeks ago, started having bright flash in peripheral vision. After a few episodes of that over a couple weeks, she would sporadically get a darkening in the Lt peripheral vision. She has also had some floaters as well.    Symptoms have been present for:  Few week(s)    Has patient previously been seen for this? Yes    By : Dr. Maldonado    Date: 6/1/21    Are there any new or worsening symptoms? No      Action Taken: Message routed to:  [facility] EYE    Travel Screening: Not Applicable

## 2022-05-20 ENCOUNTER — OFFICE VISIT (OUTPATIENT)
Dept: OPHTHALMOLOGY | Facility: CLINIC | Age: 37
End: 2022-05-20
Attending: OPHTHALMOLOGY
Payer: MEDICARE

## 2022-05-20 DIAGNOSIS — H35.063 RETINAL VASCULITIS OF BOTH EYES: Primary | ICD-10-CM

## 2022-05-20 DIAGNOSIS — G93.2 PSEUDOTUMOR CEREBRI: ICD-10-CM

## 2022-05-20 PROCEDURE — 99213 OFFICE O/P EST LOW 20 MIN: CPT | Mod: GC | Performed by: STUDENT IN AN ORGANIZED HEALTH CARE EDUCATION/TRAINING PROGRAM

## 2022-05-20 PROCEDURE — G0463 HOSPITAL OUTPT CLINIC VISIT: HCPCS

## 2022-05-20 PROCEDURE — 92134 CPTRZ OPH DX IMG PST SGM RTA: CPT | Performed by: STUDENT IN AN ORGANIZED HEALTH CARE EDUCATION/TRAINING PROGRAM

## 2022-05-20 RX ORDER — SUCRALFATE ORAL 1 G/10ML
SUSPENSION ORAL 3 TIMES DAILY
COMMUNITY
Start: 2022-05-17

## 2022-05-20 ASSESSMENT — CONF VISUAL FIELD
OS_NORMAL: 1
METHOD: COUNTING FINGERS
OD_NORMAL: 1

## 2022-05-20 ASSESSMENT — EXTERNAL EXAM - LEFT EYE: OS_EXAM: NORMAL

## 2022-05-20 ASSESSMENT — VISUAL ACUITY
OD_SC: 20/20
METHOD: SNELLEN - LINEAR
OS_SC+: -2
OS_SC: 20/25

## 2022-05-20 ASSESSMENT — EXTERNAL EXAM - RIGHT EYE: OD_EXAM: NORMAL

## 2022-05-20 ASSESSMENT — TONOMETRY
IOP_METHOD: TONOPEN
OS_IOP_MMHG: 15
OD_IOP_MMHG: 15

## 2022-05-20 ASSESSMENT — SLIT LAMP EXAM - LIDS
COMMENTS: NORMAL
COMMENTS: NORMAL

## 2022-05-20 ASSESSMENT — CUP TO DISC RATIO
OS_RATIO: 0.3
OD_RATIO: 0.3

## 2022-05-20 NOTE — LETTER
5/20/2022       RE: Richie Thorne  [address]  [place] [address]     Dear Colleague,    Thank you for referring your patient, Richie Thorne, to the [facility] [place] at [facility]. Please see a copy of my visit note below.    Chief Complaint(s) and History of Present Illness(es):  Flashes Left Eye     Laterality: left eye    Quality: flashing    Characteristics: intermittent    Associated symptoms: floaters (BE), photophobia and dizziness.  Negative   for headaches    Pain scale: 0/10      Interval HPI:   Richie Thorne is a 37 year old patient with a history of retinal vasculitis of both eyes.  She was last seen in June 2021 at which time the recommendation was to use Durezol twice a day in the left eye, but she did not get to  this medication.  She reports that the floaters have been stable but recently noticed a bright light in the left eye in the very edge of the peripheral vision. This was intermittent for a week or two until one day there was a bright light like a flash light in her left eye. Then it went dark. Its like there is a dark spot in the left that has expanded in size and seems relatively stable.    No recent injury to the eye.     Medical Updates:  1.Currently not on any immune suppressing medications as she was not given Humira.    2. Underwent breanne-n-y gastric bypass surgery with hiatal hernia repair April 7th of this year. She has had significant episode anxiety and discomfort with breathing since surgery.     OCT left eye only May 20, 2022: No macular edema, normal inner retina without thinning.    Assessment & Plan     1. Retinal vasculitis of both eyes  Right eye remains inactive on clinical exam.  There are 2 areas of sheathing in the left eye superonasally and inferotemporally.    2. Pseudotumor cerebri  With evidence of prior but no active optic nerve edema.    Recommendations/Plan:  -There is  no active intraocular inflammation in the left eye although symptoms are present likely related to areas of retinal vascular inflammation in the superonasal periphery causing temporal symptoms.  There are no retinal vascular occlusions and no clinical macular edema is present.  -Given the absence of sight threatening complications of the retinal vasculitis as well as recent and ongoing health issues, we recommended observation without treatment at this time and to return sooner than scheduled for any worsening of symptoms.  -We will reach out to rheumatology about whether there may be an indication to start Humira at some point in the near future or if a follow-up visit should be scheduled.    Seen and discussed with Dr. Maldonado.     Ariadna Hernandez MD PGY3  Department of Ophthalmology    RTC 2 months Sohail-Pen, dilate each eye with Phenyl only, OCT, Photos, ?FA transit left eye if patient okay    Attending Physician Attestation:  Complete documentation of historical and exam elements from today's encounter can be found in the full encounter summary report (not reduplicated in this progress note). I reviewed the chief complaint(s) and history of present illness, and  confirmed and edited as necessary the review of systems, past medical/surgical history, family history, social history, and examination findings as documented by others and the treating Resident or Fellow Physician.    I examined the patient myself, discussed the findings, reviewed all ancillary testing data and modified these results and reports along with the assessment and plan with the Treating Resident or Fellow Physician. I agree with the note as detailed above.   Bernardo Maldonado M.D.  Uveitis and Medical Retina  May 20, 2022    Again, thank you for allowing me to participate in the care of your patient.    Sincerely,    Bernardo Maldonado MD  [facility] Dept of Ophthalmology  Uveitis and Medical Retina

## 2022-05-20 NOTE — PROGRESS NOTES
Chief Complaint(s) and History of Present Illness(es):  Flashes Left Eye     Laterality: left eye    Quality: flashing    Characteristics: intermittent    Associated symptoms: floaters (BE), photophobia and dizziness.  Negative   for headaches    Pain scale: 0/10      Interval HPI:   Richie Thorne is a 37 year old patient with a history of retinal vasculitis of both eyes.  She was last seen in June 2021 at which time the recommendation was to use Durezol twice a day in the left eye, but she did not get to  this medication.  She reports that the floaters have been stable but recently noticed a bright light in the left eye in the very edge of the peripheral vision. This was intermittent for a week or two until one day there was a bright light like a flash light in her left eye. Then it went dark. Its like there is a dark spot in the left that has expanded in size and seems relatively stable.    No recent injury to the eye.     Medical Updates:  1.Currently not on any immune suppressing medications as she was not given Humira.    2. Underwent breanne-n-y gastric bypass surgery with hiatal hernia repair April 7th of this year. She has had significant episode anxiety and discomfort with breathing since surgery.     OCT left eye only May 20, 2022: No macular edema, normal inner retina without thinning    Assessment & Plan     1. Retinal vasculitis of both eyes  Right eye remains inactive on clinical exam.  There are 2 areas of sheathing in the left eye superonasally and inferotemporally.    2. Pseudotumor cerebri  With evidence of prior but no active optic nerve edema    Recommendations/Plan:  -There is no active intraocular inflammation in the left eye although symptoms are present likely related to areas of retinal vascular inflammation in the superonasal periphery causing temporal symptoms.  There are no retinal vascular occlusions and no clinical macular edema is present  -Given the absence of sight threatening  complications of the retinal vasculitis as well as recent and ongoing health issues, we recommended observation without treatment at this time and to return sooner than scheduled for any worsening of symptoms  -We will reach out to rheumatology about whether there may be an indication to start Humira at some point in the near future or if a follow-up visit should be scheduled    Seen and discussed with Dr. Maldonado.     Ariadna Hernandez MD PGY3  Department of Ophthalmology    RTC 2 months Sohail-Pen, G Top visual fields, dilate each eye with Phenyl only, OCT, Photos, ?FA transit left eye if patient okay    Attending Physician Attestation:  Complete documentation of historical and exam elements from today's encounter can be found in the full encounter summary report (not reduplicated in this progress note). I reviewed the chief complaint(s) and history of present illness, and  confirmed and edited as necessary the review of systems, past medical/surgical history, family history, social history, and examination findings as documented by others and the treating Resident or Fellow Physician.    I examined the patient myself, discussed the findings, reviewed all ancillary testing data and modified these results and reports along with the assessment and plan with the Treating Resident or Fellow Physician. I agree with the note as detailed above.   Bernardo Maldonado M.D.  Uveitis and Medical Retina  May 20, 2022

## 2022-05-20 NOTE — NURSING NOTE
Chief Complaints and History of Present Illnesses   Patient presents with     Flashes Left Eye     Chief Complaint(s) and History of Present Illness(es)     Flashes Left Eye     Laterality: left eye    Quality: flashing    Characteristics: intermittent    Associated symptoms: floaters (BE), photophobia and dizziness.  Negative for headaches    Pain scale: 0/10              Comments     Richie states flashing LE for the past three weeks, starting after bi-pass surgery, with hernia repair. Last night she had LE pain     Joel Olson COT 9:14 AM May 20, 2022

## 2022-05-25 ENCOUNTER — NURSE TRIAGE (OUTPATIENT)
Dept: CALL CENTER | Age: 37
End: 2022-05-25
Payer: MEDICARE

## 2022-05-25 NOTE — TELEPHONE ENCOUNTER
Thanks for the note. Next Tuesday is the earliest we could do unless there is a dramatic change due to schedule and also her recent health issues. She should plan to be dilated and get an FA. Thank you.

## 2022-05-25 NOTE — TELEPHONE ENCOUNTER
Spoke to pt at 1225    Left eye increase in quick flashing since visit last Friday in periphery     Was having only about once a day and now quick flashing throughout day since last Friday    Pt also noticed a little longer flash that comes over eye at times.    No new floaters  No vision changes    Will review/confirm plan with Dr. Maldonado and call back    Mike Gallegos RN 12:35 PM 05/25/22

## 2022-05-25 NOTE — TELEPHONE ENCOUNTER
Spoke to pt at 1315    Scheduled at 1215 next Tuesday, May 31st (pt has 11 AM appointment elsewhere)    Reviewed would anticipate around 3 hour visit with likely fluorescein angiography     Pt aware of date/time/location and aware to contact clinic for any new floaters, vision changes, worsening symptoms    Provided [phone] option 4 for information to page on call eye provider after hours/weekend .    Pt seemed comfortable with plan/scheduling.    Mike Gallegos RN 1:21 PM 05/25/22

## 2022-05-25 NOTE — TELEPHONE ENCOUNTER
"PT # [phone]    Nurse Triage SBAR    Is this a 2nd Level Triage? YES, LICENSED PRACTITIONER REVIEW IS REQUIRED    Situation: increase in flashes- left eye      Background:history of retinal vasculitis of both eyes  Pt of Dr. Hernandez, Dr. Maldonado    Assessment: increase in left eye flashes, was told by Eye physician to call right away if this increases    Protocol Recommended Disposition:     High priority note to eye clinic for recommendation/plan, ? RTC      Recommendation: High priority note to eye clinic for recommendation/plan, ? RTC       Reason for Disposition    Flashes of light  (Exception: brief from pressing on the eyeball)    Additional Information    Negative: Weakness of the face, arm or leg on one side of the body    Negative: Followed getting substance in the eye    Negative: Foreign body or object is or was lodged in the eye    Negative: Followed an eye injury    Negative: Followed sun lamp or sun exposure (UV keratitis)    Negative: Yellow or green discharge (pus) in the eye    Negative: Pregnant    Negative: Complete loss of vision in 1 or both eyes    Negative: Severe eye pain    Negative: Severe headache    Negative: Double vision    Negative: Blurred vision or visual changes and present now and sudden onset or new (e.g., minutes, hours, days) (Exception: seeing floaters / black specks OR previously diagnosed migraine headaches with same symptoms)    Negative: Patient sounds very sick or weak to the triager    Negative: Eye pain and brief (now gone) blurred vision or visual changes    Negative: Taking digoxin (e.g., Lanoxin, Digitek, Cardoxin, Lanoxicaps; Toloxin in [place]) and blurred vision, yellow vision, or yellow-green halos    Answer Assessment - Initial Assessment Questions  1. DESCRIPTION: \"What is the vision loss like? Describe it for me.\" (e.g., complete vision loss, blurred vision, double vision, floaters, etc.)      Flashes , left peripheral vision  Quick white flash, to a " "brighter light goes from corner of eye to top of eye  6 times right in arrow, 6-7 time in the last hour  intially started 4-7 22 after surgery  Has progressively gotten worse in the last week  Can not tell what triggers this, ? After last eye appt had eye exam they used a bright light and it seems flashes started    2. LOCATION: \"One or both eyes?\" If one, ask: \"Which eye?\"   left  3. SEVERITY: \"Can you see anything?\" If so, ask: \"What can you see?\" (e.g., fine print)     4. ONSET: \"When did this begin?\" \"Did it start suddenly or has this been gradual?\"    See above  5. PATTERN: \"Does this come and go, or has it been constant since it started?\"  Not really  6. PAIN: \"Is there any pain in your eye(s)?\"  (Scale 1-10; or mild, moderate, severe)   ? Muscle tension on left side of head, just minimal  7. CONTACTS-GLASSES: \"Do you wear contacts or glasses?\"      no  8. CAUSE: \"What do you think is causing this visual problem?\"      Unknown  9. OTHER SYMPTOMS: \"Do you have any other symptoms?\" (e.g., confusion, headache, arm or leg weakness, speech problems)    None  Denies any blurry or double vision  10. PREGNANCY: \"Is there any chance you are pregnant?\" \"When was your last menstrual period?\"       no    Protocols used: VISION LOSS OR CHANGE-A-OH      "

## 2022-05-31 ENCOUNTER — OFFICE VISIT (OUTPATIENT)
Dept: OPHTHALMOLOGY | Facility: CLINIC | Age: 37
End: 2022-05-31
Attending: OPHTHALMOLOGY
Payer: MEDICARE

## 2022-05-31 DIAGNOSIS — Z79.899 HIGH RISK MEDICATION USE: ICD-10-CM

## 2022-05-31 DIAGNOSIS — G93.2 PSEUDOTUMOR CEREBRI: ICD-10-CM

## 2022-05-31 DIAGNOSIS — H35.063 RETINAL VASCULITIS OF BOTH EYES: Primary | ICD-10-CM

## 2022-05-31 PROCEDURE — 92134 CPTRZ OPH DX IMG PST SGM RTA: CPT | Performed by: OPHTHALMOLOGY

## 2022-05-31 PROCEDURE — G0463 HOSPITAL OUTPT CLINIC VISIT: HCPCS | Mod: 25

## 2022-05-31 PROCEDURE — 92235 FLUORESCEIN ANGRPH MLTIFRAME: CPT | Performed by: OPHTHALMOLOGY

## 2022-05-31 PROCEDURE — 92083 EXTENDED VISUAL FIELD XM: CPT | Performed by: OPHTHALMOLOGY

## 2022-05-31 PROCEDURE — 99214 OFFICE O/P EST MOD 30 MIN: CPT | Performed by: OPHTHALMOLOGY

## 2022-05-31 RX ORDER — ESCITALOPRAM OXALATE 10 MG/1
10 TABLET ORAL DAILY
COMMUNITY

## 2022-05-31 ASSESSMENT — TONOMETRY
OS_IOP_MMHG: 13
IOP_METHOD: TONOPEN
OD_IOP_MMHG: 14

## 2022-05-31 ASSESSMENT — SLIT LAMP EXAM - LIDS
COMMENTS: NORMAL
COMMENTS: NORMAL

## 2022-05-31 ASSESSMENT — VISUAL ACUITY
METHOD: SNELLEN - LINEAR
OS_SC: 20/20
OD_SC: 20/20

## 2022-05-31 ASSESSMENT — CONF VISUAL FIELD
OD_NORMAL: 1
METHOD: COUNTING FINGERS
OS_NORMAL: 1

## 2022-05-31 ASSESSMENT — EXTERNAL EXAM - LEFT EYE: OS_EXAM: NORMAL

## 2022-05-31 ASSESSMENT — CUP TO DISC RATIO
OD_RATIO: 0.3
OS_RATIO: 0.3

## 2022-05-31 ASSESSMENT — EXTERNAL EXAM - RIGHT EYE: OD_EXAM: NORMAL

## 2022-05-31 NOTE — PATIENT INSTRUCTIONS
The flashes are likely related to early vitreous separation from the retina. No retinal detachments are seen in either eye    Given minimal retinal vascular inflammation, no indication to start prednisone, Remicade, or Humira  Call/message if you have a sudden increase in symptoms especially with lots of new floaters

## 2022-05-31 NOTE — PROGRESS NOTES
"Chief Complaint/Presenting Concern:  Uveitis follow up    Interval History of Present Ocular Illness:  Richie Thorne is a 37 year old patient who returns for follow up of her retinal vasculitis of both eyes.  We last saw each other on May 20, 2022 at which time Richie noted a bright light in the temporal field of view of the left eye which was then followed by a dark spot in that same area of her vision.  There were a few areas of potential mild retinal vascular inflammation in the left eye without retinal vascular occlusions on clinical exam. We recommended observing and to return sooner for changes.     Richie reported persistent symptoms in the left and requested this earlier appointment. She notes a \" j shaped\" image a few times daily in the left eye and wonders if this is related to reduced Vitamin B12 level as she took this today. No flashes left eye, some start sensations each eye when showering.     Interval Updates to Medical/Family/Social History:    Recovery continues to go okay. Bariatric Team did not think this had her surgery had anything to do her eyes. She did not have these symptoms prior to surgery.     Relevant Review of Systems Updates:  Feeling less anxious. Adjusting well to new dietary changes. Some stable whooshing sounds.     Laboratory Testing: Reviewed from 5/13/2022: Normal CBC, CMP within normal limits, negative urine hCG     Current eye related medications: None specifically    Retina/Uveitis Imaging:   G Top Screening Visual Fields May 31, 2022  right eye: Reliable, no defects  left eye: Moderately reliable, no defects    OCT Macula May 31, 2022  right eye: Normal contour, no inner/outer retinal changes, no fluid. Stable  No NFL thickening  left eye: Rare vitreous opacities,  Normal contour, no inner/outer retinal changes, no fluid. Stable  No NFL thickening    Optos FA May 31, 2022  right eye: No disc, no foveal leakage. Faint nasal area of segmental large vessel hyperfluorescence. "   left eye: Oral Transit. Improved segmental staining off inferior nasal arcade. No other areas of large vessel staining, no foveal, no disc leakage    Assessment:     1. Retinal vasculitis of both eyes  Symptoms left eye, but only one area of segmental staining in the right eye. No retinal vascular occlusions.     2. Pseudotumor cerebri  Some whooshing sounds in the ear, no disc edema on exam, no disc leakage on angiography, no focal visual field defects    3. High risk medication use  Pending insurance approval     Plan/Recommendations:      Discussed findings with patient. Retinal angiography today with only one area of retinal vessel inflammation in the right eye without occlusions. In the symptomatic left eye, there are no areas of active retinal vessel inflammation. Peripheral retinal exam without retinal breaks or detachments. We discussed that the symptoms are likely related to early vitreous separation and that these are likely to resolve over time. We discussed return precautions but did not recommend any treatment at this time    There are no visual field defects related to the flashing and no enlarged blind spots or other changes to suggest recurrence of pseudotumor cerebri.     Eye pressure is normal in each eye     Recommend additional testing: None at this time    Given minimal retinal vascular inflammation, no indication to start prednisone, Remicade, or Humira at this time.     No Durezol drops needed at this time    Call/message if you have a sudden increase in symptoms especially with lots of new floaters with increasing flashing lights    RTC Move up late July to early July  Cecilia diate each eye with Phenyl only, no testing. [facility] will call to re-schedule    Physician Attestation     Attending Physician Attestation:  Complete documentation of historical and exam elements from today's encounter can be found in the full encounter summary report (not reduplicated in this progress note). I  personally obtained the chief complaint(s) and history of present illness. I confirmed and edited as necessary the review of systems, past medical/surgical history, family history, social history, and examination findings as documented by others; and I examined the patient myself. I personally reviewed the relevant tests, images, and reports as documented above. I formulated and edited as necessary the assessment and plan and discussed the findings and management plan with the patient and family members present at the time of this visit.  Bernardo Maldonado M.D., Uveitis and Medical Retina, May 31, 2022

## 2022-05-31 NOTE — NURSING NOTE
Chief Complaints and History of Present Illnesses   Patient presents with     Retinal Vasculitis Evaluation     Chief Complaint(s) and History of Present Illness(es)     Retinal Vasculitis Evaluation     Laterality: both eyes    Onset: weeks ago              Comments     Patient states that when she was here last she was getting a white flash every couple days. Patient states that after she left last week she notices a orange bright searing light that comes in her central vision and then floats up. She notices it several times a day. She states that it could be 5-25 times a day. She is trying to keep count but loses track.She states that there is no rhyme or reason to when it happens.She states when she was parking today she noticed little bell drop down into her vision. She states that they started lower than they have in the passed.She states that they were white and blue.   Floaters in both eyes, no changes.   No pain and irritation.     Eusebio GAMBOA, May 31, 2022 ,12:36 PM

## 2022-05-31 NOTE — LETTER
"5/31/2022       RE: Richie Thorne  [address]  [place] [address]     Dear Colleague,    Thank you for referring your patient, Richie Thorne, to the [facility] at [facility]. Please see a copy of my visit note below.    Chief Complaint/Presenting Concern:  Uveitis follow up    Interval History of Present Ocular Illness:  Richie Thorne is a 37 year old patient who returns for follow up of her retinal vasculitis of both eyes.  We last saw each other on May 20, 2022 at which time Richie noted a bright light in the temporal field of view of the left eye which was then followed by a dark spot in that same area of her vision.  There were a few areas of potential mild retinal vascular inflammation in the left eye without retinal vascular occlusions on clinical exam. We recommended observing and to return sooner for changes.     Richie reported persistent symptoms in the left and requested this earlier appointment. She notes a \" j shaped\" image a few times daily in the left eye and wonders if this is related to reduced Vitamin B12 level as she took this today. No flashes left eye, some start sensations each eye when showering.     Interval Updates to Medical/Family/Social History:    Recovery continues to go okay. Bariatric Team did not think this had her surgery had anything to do her eyes. She did not have these symptoms prior to surgery.     Relevant Review of Systems Updates:  Feeling less anxious. Adjusting well to new dietary changes. Some stable whooshing sounds.     Laboratory Testing: Reviewed from 5/13/2022: Normal CBC, CMP within normal limits, negative urine hCG     Current eye related medications: None specifically      Retina/Uveitis Imaging:   G Top Screening Visual Fields May 31, 2022  right eye: Reliable, no defects  left eye: Moderately reliable, no defects    OCT Macula May 31, 2022  right eye: Normal contour, " no inner/outer retinal changes, no fluid. Stable  No NFL thickening  left eye: Rare vitreous opacities,  Normal contour, no inner/outer retinal changes, no fluid. Stable  No NFL thickening    Optos FA May 31, 2022  right eye: No disc, no foveal leakage. Faint nasal area of segmental large vessel hyperfluorescence.   left eye: Oral Transit. Improved segmental staining off inferior nasal arcade. No other areas of large vessel staining, no foveal, no disc leakage    Assessment:     1. Retinal vasculitis of both eyes  Symptoms left eye, but only one area of segmental staining in the right eye. No retinal vascular occlusions.     2. Pseudotumor cerebri  Some whooshing sounds in the ear, no disc edema on exam, no disc leakage on angiography, no focal visual field defects    3. High risk medication use  Pending insurance approval     Plan/Recommendations:      Discussed findings with patient. Retinal angiography today with only one area of retinal vessel inflammation in the right eye without occlusions. In the symptomatic left eye, there are no areas of active retinal vessel inflammation. Peripheral retinal exam without retinal breaks or detachments. We discussed that the symptoms are likely related to early vitreous separation and that these are likely to resolve over time. We discussed return precautions but did not recommend any treatment at this time    There are no visual field defects related to the flashing and no enlarged blind spots or other changes to suggest recurrence of pseudotumor cerebri.     Eye pressure is normal in each eye     Recommend additional testing: None at this time    Given minimal retinal vascular inflammation, no indication to start prednisone, Remicade, or Humira at this time.     No Durezol drops needed at this time    Call/message if you have a sudden increase in symptoms especially with lots of new floaters with increasing flashing lights    RTC Move up late July to early July Cecilia  diate each eye with Phenyl only, no testing. [facility] will call to re-schedule    Physician Attestation   Attending Physician Attestation:  Complete documentation of historical and exam elements from today's encounter can be found in the full encounter summary report (not reduplicated in this progress note). I personally obtained the chief complaint(s) and history of present illness. I confirmed and edited as necessary the review of systems, past medical/surgical history, family history, social history, and examination findings as documented by others; and I examined the patient myself. I personally reviewed the relevant tests, images, and reports as documented above. I formulated and edited as necessary the assessment and plan and discussed the findings and management plan with the patient and family.  Bernardo Maldonado MD.    Sincerely,    Bernardo Maldonado MD  [facility] Dept of Ophthalmology  Uveitis and Medical Retina

## 2023-12-19 ENCOUNTER — MEDICAL CORRESPONDENCE (OUTPATIENT)
Dept: HEALTH INFORMATION MANAGEMENT | Facility: CLINIC | Age: 38
End: 2023-12-19

## 2024-01-01 ENCOUNTER — MEDICAL CORRESPONDENCE (OUTPATIENT)
Dept: HEALTH INFORMATION MANAGEMENT | Facility: CLINIC | Age: 39
End: 2024-01-01

## 2024-01-26 ENCOUNTER — MEDICAL CORRESPONDENCE (OUTPATIENT)
Dept: HEALTH INFORMATION MANAGEMENT | Facility: CLINIC | Age: 39
End: 2024-01-26
Payer: MEDICARE

## 2024-02-12 ENCOUNTER — MEDICAL CORRESPONDENCE (OUTPATIENT)
Dept: HEALTH INFORMATION MANAGEMENT | Facility: CLINIC | Age: 39
End: 2024-02-12
Payer: MEDICARE

## 2024-02-16 ENCOUNTER — TRANSCRIBE ORDERS (OUTPATIENT)
Dept: OTHER | Age: 39
End: 2024-02-16

## 2024-02-16 DIAGNOSIS — K59.89 VISCERAL HYPERSENSITIVITY SYNDROME: Primary | ICD-10-CM

## 2024-02-16 DIAGNOSIS — F41.1 GAD (GENERALIZED ANXIETY DISORDER): ICD-10-CM

## 2024-02-16 DIAGNOSIS — M79.7 FIBROMYALGIA: ICD-10-CM

## 2024-02-23 ENCOUNTER — TRANSCRIBE ORDERS (OUTPATIENT)
Dept: OTHER | Age: 39
End: 2024-02-23

## 2024-02-23 ENCOUNTER — MEDICAL CORRESPONDENCE (OUTPATIENT)
Dept: HEALTH INFORMATION MANAGEMENT | Facility: CLINIC | Age: 39
End: 2024-02-23
Payer: MEDICARE

## 2024-02-23 DIAGNOSIS — G25.9 FUNCTIONAL MOVEMENT DISORDER: Primary | ICD-10-CM

## 2024-02-23 DIAGNOSIS — G40.A09: ICD-10-CM

## 2024-02-27 ENCOUNTER — VIRTUAL VISIT (OUTPATIENT)
Dept: NEUROPSYCHOLOGY | Facility: CLINIC | Age: 39
End: 2024-02-27
Payer: MEDICARE

## 2024-02-27 DIAGNOSIS — M79.7 FIBROMYALGIA: ICD-10-CM

## 2024-02-27 DIAGNOSIS — G40.A09: ICD-10-CM

## 2024-02-27 DIAGNOSIS — G25.9 FUNCTIONAL MOVEMENT DISORDER: Primary | ICD-10-CM

## 2024-02-27 DIAGNOSIS — F43.10 PTSD (POST-TRAUMATIC STRESS DISORDER): ICD-10-CM

## 2024-02-27 DIAGNOSIS — F32.9 MAJOR DEPRESSIVE DISORDER WITH CURRENT ACTIVE EPISODE, UNSPECIFIED DEPRESSION EPISODE SEVERITY, UNSPECIFIED WHETHER RECURRENT: ICD-10-CM

## 2024-02-27 PROCEDURE — 90791 PSYCH DIAGNOSTIC EVALUATION: CPT | Mod: 95

## 2024-02-27 NOTE — LETTER
"    2/27/2024        RE: Richie Thorne  [address]  [place] [address]        CONFIDENTIAL NEUROPSYCHOLOGICAL EVALUATION  **This is a medical document intended to be read within the context of the larger medical chart and for communication with the referring provider.  It is writing in medical language and may contain abbreviations that are unfamiliar.  Please consult the provider for further clarification.**    Name:  Richie Thorne  (Richie)  YOB: 1985  Date of Assessment: Feb 27, 2024  Referring Provider: Carlos Aguilar, PhD, [facility]    Reason for Referral: Richie Thorne is a 39 year old female  who was referred for a neuropsychological evaluation within the context of suspected Functional Neurological disorder.       Subjective/Objective:     I met with Richie for 23 minutes via telehealth services.  I opened the session asking for clarification about why she was referred for neuropsychological services as I provide two services  - psychological intervention as well as neuropsychological testing.  Richie was adamant that she did not know why she was referred to me.  She spent a good portion of our time together quite escalated.  She recounted how she has been pushed from provider to provider without getting any answers or interventions, and in general, she is \"sick\" of feeling this bad. When I tried to explain that I do cognitive testing looking at memory concerns - Richie was not able to say if she was having memory challenges or if she wanted to go forward with testing.  From what I was able to glean from our session, Richie is interested in getting a confirmation of Functional Neurological Disorder diagnosis - which given her neurological presentation including seizure-like presentation - would need to come from neurology.  I explained that the referral came in with neurology and neuropsychological services listed on the sheet - and it's recommended she get in with neurology first (including " "confirmed FND diangosis) before undergoing a cognitive evaluation.  I explained that I don't have many records - which escalated her even further stating that I should have received records from multiple sources (some are available in EMR through care Everywhere).  Of note, she was recently in the ED but left prior to the doctor seeing and evaluating her.     With regards to symptoms, Richie endorsed seizure-like presentation on the left of her body - starting with left-sided head pain.  She also reported right-sided symptoms that are \"too hard to explain right now.\"  She noted that her neurological symptoms do get worse with emotions and breathing.  She has been doing NET therapy for over 2 years, and her neurological symptoms have gotten worse the more she has dug into her trauma.  She did report a number of cognitive concerns including making statements like \"I'll never remember that, can you do that for me?\"  However, it was difficult to ask follow-up questions given Richie's escalated presentation.    Additional behavioral observations:  Richie's speech was quite pressured, which offered little opportunity for me to jump in to provide clarification or address her concerns.  She was not open to thought restructuring or redirection, and was often dependent on me to support her own memory challenges.  Affect was consistent with angry, \"pissed\" and anxious mood.     Assessment: It's clear that Richie is frustrated with the medical model for treatment for her longstanding and complicated neurological symptoms.  She was frustrated that no one has been helping her, despite professionals - like myself, trying to figure out how to provide services for her.  It was challenging for me to have a dialogue with her as there was limited back-and-forth or reciprocal conversation.  Relationship building and building trust will be of the utmost importance with working with Richie going forward.     RECOMMENDATIONS:   Richie should " follow-up with neurology, specifically movement disorders or someone with FND experience.  Please make sure the referral is specific for neurology and FND.   I recommend the book:  Overcoming Fucntional Neurological Symptoms:  A five area approach.  I highly recommend that Richie consider this resource when working with her therapist.   At this time, a neuropsychological evaluation is not indicated for a number of reasons including poor buy in, extremely anxious/angered mood, as well as perceived goals that are more important than a cognitive evaluation.      DIAGNOSES  FND (Suspected)  History of trauma, MDD, anxiety, and PTSD      Thank you for allowing me to participate in Richie Thorne's care.   I hope this report is helpful to all those involved.  I would be happy to discuss these results in detail or answer any other questions.              Riana Lopez PsyD, LP  Clinical Neuropsychologist        Activities included in this evaluation CPT Code Time Units   Psychological Diagnostic Interview 18859 - 1   Neuropsychology Professional Time 67654     Add on 11021     Neuropsychologist Admin/Scoring Tests 12372     Add On 38333     Psychometrician Time - Test 31379     Admin/Scoring [id number]     DX:                                     Sincerely,        Hollie Lopez

## 2024-02-27 NOTE — PROGRESS NOTES
"CONFIDENTIAL NEUROPSYCHOLOGICAL EVALUATION  **This is a medical document intended to be read within the context of the larger medical chart and for communication with the referring provider.  It is writing in medical language and may contain abbreviations that are unfamiliar.  Please consult the provider for further clarification.**    Name:  Richie Thorne  (Richie)  YOB: 1985  Date of Assessment: Feb 27, 2024  Referring Provider: Carlos Aguilar, PhD, [facility]    Reason for Referral: Richie Thorne is a 39 year old female  who was referred for a neuropsychological evaluation within the context of suspected Functional Neurological disorder.       Subjective/Objective:     I met with Richie for 23 minutes via telehealth services.  I opened the session asking for clarification about why she was referred for neuropsychological services as I provide two services  - psychological intervention as well as neuropsychological testing.  Richie was adamant that she did not know why she was referred to me.  She spent a good portion of our time together quite escalated.  She recounted how she has been pushed from provider to provider without getting any answers or interventions, and in general, she is \"sick\" of feeling this bad. When I tried to explain that I do cognitive testing looking at memory concerns - Richie was not able to say if she was having memory challenges or if she wanted to go forward with testing.  From what I was able to glean from our session, Richie is interested in getting a confirmation of Functional Neurological Disorder diagnosis - which given her neurological presentation including seizure-like presentation - would need to come from neurology.  I explained that the referral came in with neurology and neuropsychological services listed on the sheet - and it's recommended she get in with neurology first (including confirmed FND diangosis) before undergoing a cognitive evaluation.  I explained " "that I don't have many records - which escalated her even further stating that I should have received records from multiple sources (some are available in EMR through care Everywhere).  Of note, she was recently in the ED but left prior to the doctor seeing and evaluating her.     With regards to symptoms, Richie endorsed seizure-like presentation on the left of her body - starting with left-sided head pain.  She also reported right-sided symptoms that are \"too hard to explain right now.\"  She noted that her neurological symptoms do get worse with emotions and breathing.  She has been doing NET therapy for over 2 years, and her neurological symptoms have gotten worse the more she has dug into her trauma.  She did report a number of cognitive concerns including making statements like \"I'll never remember that, can you do that for me?\"  However, it was difficult to ask follow-up questions given Richie's escalated presentation.    Additional behavioral observations:  Richie's speech was quite pressured, which offered little opportunity for me to jump in to provide clarification or address her concerns.  She was not open to thought restructuring or redirection, and was often dependent on me to support her own memory challenges.  Affect was consistent with angry, \"pissed\" and anxious mood.     Assessment: It's clear that Richie is frustrated with the medical model for treatment for her longstanding and complicated neurological symptoms.  She was frustrated that no one has been helping her, despite professionals - like myself, trying to figure out how to provide services for her.  It was challenging for me to have a dialogue with her as there was limited back-and-forth or reciprocal conversation.  Relationship building and building trust will be of the utmost importance with working with Richie going forward.     RECOMMENDATIONS:   Richie should follow-up with neurology, specifically movement disorders or someone with FND " experience.  Please make sure the referral is specific for neurology and FND.   I recommend the book:  Overcoming Fucntional Neurological Symptoms:  A five area approach.  I highly recommend that Richie consider this resource when working with her therapist.   At this time, a neuropsychological evaluation is not indicated for a number of reasons including poor buy in, extremely anxious/angered mood, as well as perceived goals that are more important than a cognitive evaluation.      DIAGNOSES  FND (Suspected)  History of trauma, MDD, anxiety, and PTSD      Thank you for allowing me to participate in Richie Thorne's care.   I hope this report is helpful to all those involved.  I would be happy to discuss these results in detail or answer any other questions.              Riana Lopez PsyD, LP  Clinical Neuropsychologist        Activities included in this evaluation CPT Code Time Units   Psychological Diagnostic Interview 01961 - 1   Neuropsychology Professional Time 95453     Add on 60297     Neuropsychologist Admin/Scoring Tests 56642     Add On 19069     Psychometrician Time - Test 65174     Admin/Scoring [id number]     DX:

## 2024-03-22 ENCOUNTER — TRANSCRIBE ORDERS (OUTPATIENT)
Dept: OTHER | Age: 39
End: 2024-03-22

## 2024-03-22 DIAGNOSIS — M79.7 FIBROMYALGIA: ICD-10-CM

## 2024-03-22 DIAGNOSIS — K59.89 VISCERAL HYPERSENSITIVITY SYNDROME: Primary | ICD-10-CM

## 2024-04-15 NOTE — LETTER
8/26/2020       RE: Richie Thorne  [address]  [place] [address]     Dear Colleague,    Thank you for referring your patient, Richie Thorne, to the EYE CLINIC at [facility]. Please see a copy of my visit note below.    Chief Complaint/Presenting Concern: Uveitis evaluation    History of Present Illness:   Richie Thorne is a 35 year old patient who presents for evaluation of vitritis of both eyes from Retina Specialist, Dr. Fred Garcia at [facility].    There is a history of blurry vision which was identified January 2020 without clear explanation of the floaters without clear relationship to prior pseudotumor cerebri.  Around last summer when moving into a new place, there was associated redness of the right eye without pain which has persisted since.     The patient then saw another Eye Provider in July 2020 who identified some inflammation in each eye, she was given a steroid drop for redness, but was uncertain about its long term use, so this was stopped. She did also see some inflammation in the back of the eye, she thought unrelated to the redness of the eye, but then referred the patient to Dr. Fred Garcia.    A few weeks ago the patient saw Dr. Fred Garcia with blurriness of the vision and was identified as having vitreous inflammation with some intermittent irregularity of the optic disks in both eyes.  It was then that this referral was made. Today, both eyes feel a little uncomfortable, but the floaters are present in both eyes.    Ms. Thorne did see a Rheumatologist, Dr. Dudley Benjamin in [place], [place] who suspected possible Ankylosing Spondylitis. Labs noted per Dr. Fred Garcia's note were negative/normal for ESR, HIV. Prior trepneoma, CRP, ANCA negative. A follow up video visit in May 2020 with another Rheumatologist did not think that she have Ankylosing spondylitis.     Additional Ocular History:  1.  Pseudotumor cerebri diagnosed in 2004 after starting  on Lithium for Depression which caused some kaleidoscope symptoms in the vision. Was found to have optic disc edema and had lumbar puncture with elevated intracranial pressure. Had serial lumbar punctures and also used Diamox. This took about four to six months to resolve. No recurrence since 2005 necessitating Diamox or Lumbar punctures, but sinus/headaches occur intermittently.    Relevant Past Medical/Family/Social History:  1.  Mild Intermittent Asthma  2.  Depression   3.  COVID 19+ in May 2020, hospitalized but not intubated.  4. Post Traumatic Stress Disorder.  5. Possible diagnosis of Ankylosing Spondylitis with HLA-B27+ in February 2020    No known family history of eye conditions. Had pets when living in [place] previously. Tick bites as child and spent time in the woods, but no prior treatment for Lyme or other ticks.     Relevant Review of Systems: Intermittent sinus/allergy issues, intermittent headaches, genital sores in the past. Intermittent back pain.      Current eye related medications: Intermittent steroid eye drops    Retina/Uveitis Imaging:  OCT Spectralis Macula August 26, 2020  right eye: Normal contour, no fluid. Normal choroidal thickness. No NFL edema  left eye:  Normal contour, no fluid. Normal choroidal thickness. Old NFL edema    Optos Photos August 26, 2020  right eye: Old water lashaun around disc  left eye: Old water lashaun around disc    Optos Fluorescein Angiogram August 26, 2020  right eye: Normal transit. Late disc staining, no foveal leakage. Far peripheral large vessel segmental staining, but no occlusions. No significant fine vascular leakage  left eye: Late disc staining, no foveal leakage. Far peripheral large vessel segmental staining, and one inferior, but no occlusions. No significant fine vascular leakage    Assessment:    1. Retinal vasculitis of both eyes  Symptomatic with floaters and haze/gray spots in vision. There are areas of large vessel segmental staining but no  occlusions, no macular edema, and no retinal neovascularization.     2. Pseudotumor cerebri  Color plates normal in each eye, intermittent headaches, but no optic disc edema.    3. HLA B27 (HLA B27 positive)  With some associated back pain and possible ankylosing spondylitis.     4. High risk medication use  Previously used prednisone    Plan/Recommendations:    Discussed findings with patient. The HLA B27 positivity may be related ankylosing spondylitis but there is no associated anterior uveitis (typical uveitis association)    The redness of the right eye is likely associated with some allergic changes, although this was not affected by the steroid eye drops. Recommend allergy eye drops or over the counter artificial tears to see if this will help with the redness    The retinal vascular angiography today shows large vessel inflammation which is likely the cause of the floaters in the vision. There are no occlusions ,macular edema, or retinal neovascularization to warrant any urgent treatment at this time.  Discussed that topical steroids are unlikely to significantly improve the symptoms, and that oral prednisone could be used for flares but this is not ideal to be used for long periods of time.  Recommend additional work-up as below.  We may ultimately decide that steroid sparing therapy may be warranted but would appreciate rheumatology involvement which may help dictate specific therapy.  Would be in favor of an antimetabolite such as oral mycophenolate    Regarding the optic discs, there is some late staining, but no active disc edema to warrant urgent Neuro-Ophthalmology at this time for prior papilledema    Additional diagnostic testing for causes of retinal vasculitis: ANCA, Lyme, Bartonella, Quantiferon, Syphilis    Will follow up with Rheumatology regarding the HLA B27+ and possible ankylosing spondylitis.    RTC 1 month IOP, dilate, no testing     Physician Attestation     Attending Physician  Attestation:  Complete documentation of historical and exam elements from today's encounter can be found in the full encounter summary report (not reduplicated in this progress note). I personally obtained the chief complaint(s) and history of present illness. I confirmed and edited as necessary the review of systems, past medical/surgical history, family history, social history, and examination findings as documented by others; and I examined the patient myself. I personally reviewed the relevant tests, images, and reports as documented above. I formulated and edited as necessary the assessment and plan and discussed the findings and management plan with the patient and any family members present at the time of the visit.  Bernardo Maldonado M.D., Uveitis and Medical Retina, August 26, 2020     Again, thank you for allowing me to participate in the care of your patient.      Sincerely,    Bernardo Maldonado MD  [facility] Dept of Ophthalmology  Uveitis and Medical Retina     Feeling of being under threat and being unable to control threat
